# Patient Record
Sex: MALE | Race: BLACK OR AFRICAN AMERICAN | NOT HISPANIC OR LATINO | URBAN - METROPOLITAN AREA
[De-identification: names, ages, dates, MRNs, and addresses within clinical notes are randomized per-mention and may not be internally consistent; named-entity substitution may affect disease eponyms.]

---

## 2016-10-12 RX ORDER — HYDRALAZINE HCL 50 MG
1 TABLET ORAL
Qty: 0 | Refills: 0 | COMMUNITY
Start: 2016-10-12

## 2017-02-18 ENCOUNTER — INPATIENT (INPATIENT)
Facility: HOSPITAL | Age: 77
LOS: 2 days | Discharge: HOME | End: 2017-02-21
Attending: INTERNAL MEDICINE | Admitting: INTERNAL MEDICINE
Payer: MEDICARE

## 2017-02-18 VITALS
SYSTOLIC BLOOD PRESSURE: 228 MMHG | HEART RATE: 79 BPM | RESPIRATION RATE: 16 BRPM | DIASTOLIC BLOOD PRESSURE: 122 MMHG | OXYGEN SATURATION: 100 % | TEMPERATURE: 98 F

## 2017-02-18 DIAGNOSIS — I16.0 HYPERTENSIVE URGENCY: ICD-10-CM

## 2017-02-18 DIAGNOSIS — N18.6 END STAGE RENAL DISEASE: ICD-10-CM

## 2017-02-18 DIAGNOSIS — E11.42 TYPE 2 DIABETES MELLITUS WITH DIABETIC POLYNEUROPATHY: ICD-10-CM

## 2017-02-18 LAB
ALBUMIN SERPL ELPH-MCNC: 4 G/DL — SIGNIFICANT CHANGE UP (ref 3.3–5)
ALP SERPL-CCNC: 42 U/L — SIGNIFICANT CHANGE UP (ref 40–120)
ALT FLD-CCNC: 18 U/L — SIGNIFICANT CHANGE UP (ref 4–41)
AST SERPL-CCNC: 46 U/L — HIGH (ref 4–40)
BASE EXCESS BLDV CALC-SCNC: 5.8 MMOL/L — SIGNIFICANT CHANGE UP
BASOPHILS # BLD AUTO: 0.02 K/UL — SIGNIFICANT CHANGE UP (ref 0–0.2)
BASOPHILS NFR BLD AUTO: 0.2 % — SIGNIFICANT CHANGE UP (ref 0–2)
BILIRUB SERPL-MCNC: 1 MG/DL — SIGNIFICANT CHANGE UP (ref 0.2–1.2)
BLOOD GAS VENOUS - CREATININE: 6.36 MG/DL — HIGH (ref 0.5–1.3)
BUN SERPL-MCNC: 35 MG/DL — HIGH (ref 7–23)
CALCIUM SERPL-MCNC: 8.9 MG/DL — SIGNIFICANT CHANGE UP (ref 8.4–10.5)
CHLORIDE BLDV-SCNC: 101 MMOL/L — SIGNIFICANT CHANGE UP (ref 96–108)
CHLORIDE SERPL-SCNC: 94 MMOL/L — LOW (ref 98–107)
CK MB BLD-MCNC: 0.9 — SIGNIFICANT CHANGE UP (ref 0–2.5)
CK MB BLD-MCNC: 5.08 NG/ML — SIGNIFICANT CHANGE UP (ref 1–6.6)
CK SERPL-CCNC: 468 U/L — HIGH (ref 30–200)
CK SERPL-CCNC: 578 U/L — HIGH (ref 30–200)
CO2 SERPL-SCNC: 25 MMOL/L — SIGNIFICANT CHANGE UP (ref 22–31)
CREAT SERPL-MCNC: 5.93 MG/DL — HIGH (ref 0.5–1.3)
EOSINOPHIL # BLD AUTO: 0.32 K/UL — SIGNIFICANT CHANGE UP (ref 0–0.5)
EOSINOPHIL NFR BLD AUTO: 4 % — SIGNIFICANT CHANGE UP (ref 0–6)
GAS PNL BLDV: 134 MMOL/L — LOW (ref 136–146)
GLUCOSE BLDV-MCNC: 116 — HIGH (ref 70–99)
GLUCOSE SERPL-MCNC: 120 MG/DL — HIGH (ref 70–99)
HBA1C BLD-MCNC: 5.1 % — SIGNIFICANT CHANGE UP (ref 4–5.6)
HBV SURFACE AG SER-ACNC: NEGATIVE — SIGNIFICANT CHANGE UP
HCO3 BLDV-SCNC: 28 MMOL/L — HIGH (ref 20–27)
HCT VFR BLD CALC: 36.1 % — LOW (ref 39–50)
HCT VFR BLDV CALC: 36.3 % — LOW (ref 39–51)
HGB BLD-MCNC: 11.7 G/DL — LOW (ref 13–17)
HGB BLDV-MCNC: 11.8 G/DL — LOW (ref 13–17)
IMM GRANULOCYTES NFR BLD AUTO: 0.1 % — SIGNIFICANT CHANGE UP (ref 0–1.5)
LACTATE BLDV-MCNC: 1.4 MMOL/L — SIGNIFICANT CHANGE UP (ref 0.5–2)
LYMPHOCYTES # BLD AUTO: 1.81 K/UL — SIGNIFICANT CHANGE UP (ref 1–3.3)
LYMPHOCYTES # BLD AUTO: 22.3 % — SIGNIFICANT CHANGE UP (ref 13–44)
MCHC RBC-ENTMCNC: 32.4 % — SIGNIFICANT CHANGE UP (ref 32–36)
MCHC RBC-ENTMCNC: 32.5 PG — SIGNIFICANT CHANGE UP (ref 27–34)
MCV RBC AUTO: 100.3 FL — HIGH (ref 80–100)
MONOCYTES # BLD AUTO: 1.05 K/UL — HIGH (ref 0–0.9)
MONOCYTES NFR BLD AUTO: 13 % — SIGNIFICANT CHANGE UP (ref 2–14)
NEUTROPHILS # BLD AUTO: 4.89 K/UL — SIGNIFICANT CHANGE UP (ref 1.8–7.4)
NEUTROPHILS NFR BLD AUTO: 60.4 % — SIGNIFICANT CHANGE UP (ref 43–77)
PCO2 BLDV: 56 MMHG — HIGH (ref 41–51)
PH BLDV: 7.36 PH — SIGNIFICANT CHANGE UP (ref 7.32–7.43)
PLATELET # BLD AUTO: 192 K/UL — SIGNIFICANT CHANGE UP (ref 150–400)
PMV BLD: 10.6 FL — SIGNIFICANT CHANGE UP (ref 7–13)
PO2 BLDV: 27 MMHG — LOW (ref 35–40)
POTASSIUM BLDV-SCNC: 4.1 MMOL/L — SIGNIFICANT CHANGE UP (ref 3.4–4.5)
POTASSIUM SERPL-MCNC: SIGNIFICANT CHANGE UP MMOL/L (ref 3.5–5.3)
POTASSIUM SERPL-SCNC: SIGNIFICANT CHANGE UP MMOL/L (ref 3.5–5.3)
PROT SERPL-MCNC: 8.2 G/DL — SIGNIFICANT CHANGE UP (ref 6–8.3)
RBC # BLD: 3.6 M/UL — LOW (ref 4.2–5.8)
RBC # FLD: 14.9 % — HIGH (ref 10.3–14.5)
SAO2 % BLDV: 42.8 % — LOW (ref 60–85)
SODIUM SERPL-SCNC: 136 MMOL/L — SIGNIFICANT CHANGE UP (ref 135–145)
TROPONIN T SERPL-MCNC: 0.11 NG/ML — HIGH (ref 0–0.06)
TROPONIN T SERPL-MCNC: 0.14 NG/ML — HIGH (ref 0–0.06)
WBC # BLD: 8.1 K/UL — SIGNIFICANT CHANGE UP (ref 3.8–10.5)
WBC # FLD AUTO: 8.1 K/UL — SIGNIFICANT CHANGE UP (ref 3.8–10.5)

## 2017-02-18 PROCEDURE — 99291 CRITICAL CARE FIRST HOUR: CPT

## 2017-02-18 PROCEDURE — 99223 1ST HOSP IP/OBS HIGH 75: CPT

## 2017-02-18 PROCEDURE — 70450 CT HEAD/BRAIN W/O DYE: CPT | Mod: 26

## 2017-02-18 RX ORDER — LABETALOL HCL 100 MG
100 TABLET ORAL ONCE
Qty: 0 | Refills: 0 | Status: COMPLETED | OUTPATIENT
Start: 2017-02-18 | End: 2017-02-18

## 2017-02-18 RX ORDER — HYDRALAZINE HCL 50 MG
50 TABLET ORAL ONCE
Qty: 0 | Refills: 0 | Status: COMPLETED | OUTPATIENT
Start: 2017-02-18 | End: 2017-02-18

## 2017-02-18 RX ORDER — ACETAMINOPHEN 500 MG
650 TABLET ORAL ONCE
Qty: 0 | Refills: 0 | Status: COMPLETED | OUTPATIENT
Start: 2017-02-18 | End: 2017-02-18

## 2017-02-18 RX ORDER — ASPIRIN/CALCIUM CARB/MAGNESIUM 324 MG
81 TABLET ORAL DAILY
Qty: 0 | Refills: 0 | Status: DISCONTINUED | OUTPATIENT
Start: 2017-02-18 | End: 2017-02-21

## 2017-02-18 RX ORDER — GLUCAGON INJECTION, SOLUTION 0.5 MG/.1ML
1 INJECTION, SOLUTION SUBCUTANEOUS ONCE
Qty: 0 | Refills: 0 | Status: DISCONTINUED | OUTPATIENT
Start: 2017-02-18 | End: 2017-02-21

## 2017-02-18 RX ORDER — SUCRALFATE 1 G
1 TABLET ORAL
Qty: 0 | Refills: 0 | Status: DISCONTINUED | OUTPATIENT
Start: 2017-02-18 | End: 2017-02-21

## 2017-02-18 RX ORDER — DEXTROSE 50 % IN WATER 50 %
25 SYRINGE (ML) INTRAVENOUS ONCE
Qty: 0 | Refills: 0 | Status: DISCONTINUED | OUTPATIENT
Start: 2017-02-18 | End: 2017-02-21

## 2017-02-18 RX ORDER — HEPARIN SODIUM 5000 [USP'U]/ML
5000 INJECTION INTRAVENOUS; SUBCUTANEOUS EVERY 12 HOURS
Qty: 0 | Refills: 0 | Status: DISCONTINUED | OUTPATIENT
Start: 2017-02-18 | End: 2017-02-21

## 2017-02-18 RX ORDER — PANTOPRAZOLE SODIUM 20 MG/1
40 TABLET, DELAYED RELEASE ORAL
Qty: 0 | Refills: 0 | Status: DISCONTINUED | OUTPATIENT
Start: 2017-02-18 | End: 2017-02-21

## 2017-02-18 RX ORDER — LABETALOL HCL 100 MG
200 TABLET ORAL THREE TIMES A DAY
Qty: 0 | Refills: 0 | Status: DISCONTINUED | OUTPATIENT
Start: 2017-02-19 | End: 2017-02-21

## 2017-02-18 RX ORDER — GABAPENTIN 400 MG/1
50 CAPSULE ORAL ONCE
Qty: 0 | Refills: 0 | Status: COMPLETED | OUTPATIENT
Start: 2017-02-18 | End: 2017-02-18

## 2017-02-18 RX ORDER — HYDRALAZINE HCL 50 MG
100 TABLET ORAL
Qty: 0 | Refills: 0 | Status: DISCONTINUED | OUTPATIENT
Start: 2017-02-18 | End: 2017-02-18

## 2017-02-18 RX ORDER — SUCRALFATE 1 G
1 TABLET ORAL
Qty: 0 | Refills: 0 | COMMUNITY

## 2017-02-18 RX ORDER — HYDRALAZINE HCL 50 MG
100 TABLET ORAL THREE TIMES A DAY
Qty: 0 | Refills: 0 | Status: DISCONTINUED | OUTPATIENT
Start: 2017-02-18 | End: 2017-02-21

## 2017-02-18 RX ORDER — CAPSAICIN 0.025 %
1 CREAM (GRAM) TOPICAL
Qty: 0 | Refills: 0 | Status: DISCONTINUED | OUTPATIENT
Start: 2017-02-18 | End: 2017-02-18

## 2017-02-18 RX ORDER — INSULIN LISPRO 100/ML
VIAL (ML) SUBCUTANEOUS AT BEDTIME
Qty: 0 | Refills: 0 | Status: DISCONTINUED | OUTPATIENT
Start: 2017-02-18 | End: 2017-02-21

## 2017-02-18 RX ORDER — DEXTROSE 50 % IN WATER 50 %
12.5 SYRINGE (ML) INTRAVENOUS ONCE
Qty: 0 | Refills: 0 | Status: DISCONTINUED | OUTPATIENT
Start: 2017-02-18 | End: 2017-02-21

## 2017-02-18 RX ORDER — SODIUM CHLORIDE 9 MG/ML
1000 INJECTION, SOLUTION INTRAVENOUS
Qty: 0 | Refills: 0 | Status: DISCONTINUED | OUTPATIENT
Start: 2017-02-18 | End: 2017-02-21

## 2017-02-18 RX ORDER — DEXTROSE 50 % IN WATER 50 %
1 SYRINGE (ML) INTRAVENOUS ONCE
Qty: 0 | Refills: 0 | Status: DISCONTINUED | OUTPATIENT
Start: 2017-02-18 | End: 2017-02-21

## 2017-02-18 RX ORDER — LABETALOL HCL 100 MG
10 TABLET ORAL ONCE
Qty: 0 | Refills: 0 | Status: COMPLETED | OUTPATIENT
Start: 2017-02-18 | End: 2017-02-18

## 2017-02-18 RX ORDER — DOCUSATE SODIUM 100 MG
100 CAPSULE ORAL THREE TIMES A DAY
Qty: 0 | Refills: 0 | Status: DISCONTINUED | OUTPATIENT
Start: 2017-02-18 | End: 2017-02-21

## 2017-02-18 RX ORDER — INSULIN LISPRO 100/ML
VIAL (ML) SUBCUTANEOUS
Qty: 0 | Refills: 0 | Status: DISCONTINUED | OUTPATIENT
Start: 2017-02-18 | End: 2017-02-21

## 2017-02-18 RX ORDER — ACETAMINOPHEN 500 MG
650 TABLET ORAL EVERY 6 HOURS
Qty: 0 | Refills: 0 | Status: DISCONTINUED | OUTPATIENT
Start: 2017-02-18 | End: 2017-02-21

## 2017-02-18 RX ORDER — LABETALOL HCL 100 MG
100 TABLET ORAL ONCE
Qty: 0 | Refills: 0 | Status: DISCONTINUED | OUTPATIENT
Start: 2017-02-18 | End: 2017-02-21

## 2017-02-18 RX ORDER — SIMVASTATIN 20 MG/1
40 TABLET, FILM COATED ORAL AT BEDTIME
Qty: 0 | Refills: 0 | Status: DISCONTINUED | OUTPATIENT
Start: 2017-02-18 | End: 2017-02-21

## 2017-02-18 RX ADMIN — Medication 50 MILLIGRAM(S): at 09:44

## 2017-02-18 RX ADMIN — Medication 100 MILLIGRAM(S): at 18:44

## 2017-02-18 RX ADMIN — Medication 100 MILLIGRAM(S): at 21:15

## 2017-02-18 RX ADMIN — Medication 100 MILLIGRAM(S): at 13:24

## 2017-02-18 RX ADMIN — GABAPENTIN 50 MILLIGRAM(S): 400 CAPSULE ORAL at 13:24

## 2017-02-18 RX ADMIN — Medication 10 MILLIGRAM(S): at 13:19

## 2017-02-18 RX ADMIN — Medication 650 MILLIGRAM(S): at 10:50

## 2017-02-18 RX ADMIN — Medication 650 MILLIGRAM(S): at 01:14

## 2017-02-18 RX ADMIN — SIMVASTATIN 40 MILLIGRAM(S): 20 TABLET, FILM COATED ORAL at 21:15

## 2017-02-18 NOTE — ED BEHAVIORAL HEALTH NOTE - BEHAVIORAL HEALTH NOTE
Called by medical team regarding this patient; clinician stated that an emergent consult is not needed and will update [facility] psychiatry tomorrow.  Clinician aware that Emergency Psychiatry remains available for urgent issues.

## 2017-02-18 NOTE — ED PROVIDER NOTE - OBJECTIVE STATEMENT
78yo M w/ DM, HTN, ESRD on HD (T/Th/Sat),  p/w 3d of sensation of "pulling at his feet" that he feels is supernatural in origin.  Pt's wife at bedside states that she has not observed anything external causing these symptoms, and thinks the symptoms are due to DM neuropathy.  Pt did not complete HD session today because of these sensations.  Denies other focal symptoms.

## 2017-02-18 NOTE — H&P ADULT. - ASSESSMENT
77M ESRD on HD t/t/s, HTN, DM here w/ hypertensive urgency and abnl sensation in feet- possible neuropathic component; concerned about hypertensive encephalopathy- given uncontrolled bp's, some gait instability;   ?confusion subtle- given the description of a paranormal being on his feet

## 2017-02-18 NOTE — H&P ADULT. - PROBLEM SELECTOR PLAN 1
would lower bp by 20% today  goal 180s for 1st 24h  check ct head r/o cva  f/u enzymes  labetalol 100 po X1- pt lost iv access- iv push labetalol not given yet  ekg changed-cards [facility] called f/u recs

## 2017-02-18 NOTE — ED PROVIDER NOTE - PROGRESS NOTE DETAILS
D/w renal Dr. Peralta, who had seen pt for pt's nephrologist Dr. Flores during previous admission.  Given pt's elevated BP, he agrees w/ admission for completion of HD.  Asked to call Dr. Miranda for admission, d/w Dr. Miranda who accepted pt for admission.

## 2017-02-18 NOTE — ED PROVIDER NOTE - PMH
Bilateral cataracts  surgery done in 2006 (approx)  DM (diabetes mellitus)    ESRD (end stage renal disease)    Hypercholesteremia    Hypertension

## 2017-02-18 NOTE — ED PROVIDER NOTE - MUSCULOSKELETAL, MLM
Spine appears normal, range of motion is not limited, no muscle or joint tenderness, +RUE AVF w/ thrill

## 2017-02-18 NOTE — PROVIDER CONTACT NOTE (OTHER) - ASSESSMENT
A & O X 4 . VS taken w/ high BP. Auscultate lungs w/ fine crackles . Denies chest pain, no SOB & not in distress.

## 2017-02-18 NOTE — H&P ADULT. - HISTORY OF PRESENT ILLNESS
77M with HTN, DM, ESRD on HD (Tu, Thu, Sa), bib from dialysis this am because he was c/o abnormal sensation in his feet- asked the dialysis nurse to stop the session- he states that since Thursday Feb 16th (2 d PTA) he's been feeling that "someone is pulling his toes" and that some "paranormal being is sitting on his feet".  He doesn't see people that are not there nor does he hear voices.  Wife feels that that is the way he is describing the sensation and that he is not confused.  Of note, though, he was asking his wife for the food at home, when it was right in front of him.  Per wife, BP has been high the last couple of days.    Pt has also been "stumbling more while walking" per wife.  He denies lightheadedness or dizziness; does report a dull headache for a couple of days as well; no change in vision.  no f/c/cp/sob/abd pain/n/v/urinary issues.  "makes very little urine".  denies lower extremity cramping/burning pain     ED bp 224/119 100%  given hydralazine 50po X1  renal saw him in ER and arranged for more dialysis to be done today 77M with HTN, DM, ESRD on HD (Tu, Thu, Sa), bib from dialysis this am because he was c/o abnormal sensation in his feet- asked the dialysis nurse to stop the session- he states that since Thursday Feb 16th (2 d PTA) he's been feeling that "someone is pulling his toes" and that some "paranormal being is sitting on his feet".  He doesn't see people that are not there nor does he hear voices.  Wife feels that that is the way he is describing the sensation and that he is not confused.  Of note, though, he was asking his wife for the food at home, when it was right in front of him.  Per wife, BP has been high the last couple of days.    Pt has also been "stumbling more while walking" per wife.  He denies lightheadedness or dizziness; does report a dull headache for a couple of days as well; no change in vision.  no f/c/cp/sob/abd pain/n/v/urinary issues.  "makes very little urine".  denies lower extremity cramping/burning pain; wife reports no sleep X2days d/t the sensation in his feet    ED bp 224/119 100%  given hydralazine 50po X1  renal saw him in ER and arranged for more dialysis to be done today

## 2017-02-18 NOTE — H&P ADULT. - FAMILY HISTORY
<<-----Click on this checkbox to enter Family History Diabetes mellitus, type 2, Mother     Sibling  Still living? Unknown  Family history of heart disease, Age at diagnosis: Age Unknown

## 2017-02-18 NOTE — ED PROVIDER NOTE - PSH
AV fistula  R forearm  Bilateral cataracts  b/l surgery done in 2006 (approx)  Hydrocele, unspecified  Pt states that he had hydrocele surgery in 1989.

## 2017-02-18 NOTE — ED ADULT NURSE NOTE - OBJECTIVE STATEMENT
Received pt. in rm 11, A&Ox3, c/o auditory and visual hallucinations x 2 days. Pt. states that he feels something "supernatural thing that is pulling at his feet". Pt. only received 2 hours of dialysis tx this morning before coming to ER. Right AV fistula; goes to dialysis t/th/sat. Denies any pain and has full sensation/strength of extremities. #20G IV placed in left lower arm; labs sent; unable to make urine for sample. MD at bedside for eval. VS done as charted in triage; BP med given as ordered. Denies cp, sob or dizziness. Will continue to monitor.     pt with a c/o Auditory and Visual Hallucinations , pt states " someone put something supernatural in my home , the thing was pulling at my feet all night, pt states I went to dialysis this am , and was only able to have 2 hours of treatment because the creature was pulling at my feet" . F/S at triage 110 Received pt. in rm 11, A&Ox2, c/o auditory and visual hallucinations x 2 days. Pt. states that he feels something "supernatural thing that is pulling at his feet". Pt. only received 2 hours of dialysis tx this morning before coming to ER. Right AV fistula; goes to dialysis t/th/sat. Denies any pain and has full sensation/strength of extremities. #20G IV placed in left lower arm; labs sent; unable to make urine for sample. MD at bedside for eval. VS done as charted in triage; BP med given as ordered. Denies cp, sob or dizziness. Will continue to monitor.     pt with a c/o Auditory and Visual Hallucinations , pt states " someone put something supernatural in my home , the thing was pulling at my feet all night, pt states I went to dialysis this am , and was only able to have 2 hours of treatment because the creature was pulling at my feet" . F/S at triage 110

## 2017-02-18 NOTE — ED PROVIDER NOTE - ATTENDING CONTRIBUTION TO CARE
Attending note:   After face to face evaluation of this patient, I concur with above noted hx, pe, and care plan for this patient. +DM, dialysis with sense of supranatural forces affecting his toes.    Did not complete dialysis this am.    Evaluation in progress

## 2017-02-18 NOTE — ED ADULT TRIAGE NOTE - CHIEF COMPLAINT QUOTE
pt with a c/o Auditory and Visual Hallucinations , pt states " someone put something supernatural in my home , the thing was pulling at my feet all night, pt states I went to dialysis this am , and was only able to have 2 hours of treatment because the creature was pulling at my feet" . F/S at triage 110  pmhx ERSD HTN DM

## 2017-02-19 LAB
ALBUMIN SERPL ELPH-MCNC: 3.9 G/DL — SIGNIFICANT CHANGE UP (ref 3.3–5)
ALP SERPL-CCNC: 48 U/L — SIGNIFICANT CHANGE UP (ref 40–120)
ALT FLD-CCNC: 12 U/L — SIGNIFICANT CHANGE UP (ref 4–41)
AST SERPL-CCNC: 29 U/L — SIGNIFICANT CHANGE UP (ref 4–40)
BILIRUB SERPL-MCNC: 0.7 MG/DL — SIGNIFICANT CHANGE UP (ref 0.2–1.2)
BUN SERPL-MCNC: 25 MG/DL — HIGH (ref 7–23)
CALCIUM SERPL-MCNC: 8.6 MG/DL — SIGNIFICANT CHANGE UP (ref 8.4–10.5)
CHLORIDE SERPL-SCNC: 95 MMOL/L — LOW (ref 98–107)
CHOLEST SERPL-MCNC: 198 MG/DL — SIGNIFICANT CHANGE UP (ref 120–199)
CK MB BLD-MCNC: 4.77 NG/ML — SIGNIFICANT CHANGE UP (ref 1–6.6)
CK MB BLD-MCNC: 5.18 NG/ML — SIGNIFICANT CHANGE UP (ref 1–6.6)
CK SERPL-CCNC: 501 U/L — HIGH (ref 30–200)
CK SERPL-CCNC: 539 U/L — HIGH (ref 30–200)
CO2 SERPL-SCNC: 28 MMOL/L — SIGNIFICANT CHANGE UP (ref 22–31)
CREAT SERPL-MCNC: 5.28 MG/DL — HIGH (ref 0.5–1.3)
GLUCOSE SERPL-MCNC: 105 MG/DL — HIGH (ref 70–99)
HDLC SERPL-MCNC: 65 MG/DL — HIGH (ref 35–55)
LIPID PNL WITH DIRECT LDL SERPL: 115 MG/DL — SIGNIFICANT CHANGE UP
POTASSIUM SERPL-MCNC: 3.9 MMOL/L — SIGNIFICANT CHANGE UP (ref 3.5–5.3)
POTASSIUM SERPL-SCNC: 3.9 MMOL/L — SIGNIFICANT CHANGE UP (ref 3.5–5.3)
PROT SERPL-MCNC: 7.1 G/DL — SIGNIFICANT CHANGE UP (ref 6–8.3)
SODIUM SERPL-SCNC: 137 MMOL/L — SIGNIFICANT CHANGE UP (ref 135–145)
TRIGL SERPL-MCNC: 42 MG/DL — SIGNIFICANT CHANGE UP (ref 10–149)
TROPONIN T SERPL-MCNC: 0.16 NG/ML — HIGH (ref 0–0.06)
TROPONIN T SERPL-MCNC: 0.19 NG/ML — HIGH (ref 0–0.06)
TSH SERPL-MCNC: 1.61 UIU/ML — SIGNIFICANT CHANGE UP (ref 0.27–4.2)

## 2017-02-19 RX ADMIN — PANTOPRAZOLE SODIUM 40 MILLIGRAM(S): 20 TABLET, DELAYED RELEASE ORAL at 05:36

## 2017-02-19 RX ADMIN — Medication 1 GRAM(S): at 05:33

## 2017-02-19 RX ADMIN — Medication 100 MILLIGRAM(S): at 15:20

## 2017-02-19 RX ADMIN — Medication 81 MILLIGRAM(S): at 12:27

## 2017-02-19 RX ADMIN — Medication 1 TABLET(S): at 15:19

## 2017-02-19 RX ADMIN — Medication 200 MILLIGRAM(S): at 05:33

## 2017-02-19 RX ADMIN — PANTOPRAZOLE SODIUM 40 MILLIGRAM(S): 20 TABLET, DELAYED RELEASE ORAL at 18:52

## 2017-02-19 RX ADMIN — Medication 1 GRAM(S): at 18:53

## 2017-02-19 RX ADMIN — HEPARIN SODIUM 5000 UNIT(S): 5000 INJECTION INTRAVENOUS; SUBCUTANEOUS at 18:53

## 2017-02-19 RX ADMIN — HEPARIN SODIUM 5000 UNIT(S): 5000 INJECTION INTRAVENOUS; SUBCUTANEOUS at 05:33

## 2017-02-19 RX ADMIN — Medication 100 MILLIGRAM(S): at 05:33

## 2017-02-19 RX ADMIN — Medication 1 GRAM(S): at 12:27

## 2017-02-19 RX ADMIN — Medication 200 MILLIGRAM(S): at 15:24

## 2017-02-19 RX ADMIN — SIMVASTATIN 40 MILLIGRAM(S): 20 TABLET, FILM COATED ORAL at 21:37

## 2017-02-19 RX ADMIN — Medication 200 MILLIGRAM(S): at 21:37

## 2017-02-19 NOTE — OCCUPATIONAL THERAPY INITIAL EVALUATION ADULT - PERTINENT HX OF CURRENT PROBLEM, REHAB EVAL
Pt is a 77M w/ a hx of HTN, DM, & ESRD on HD (Tu, Thu, Sa), who presents from dialysis to [facility] on 2/18/17 because he was c/o abnormal sensation in his feet- asked the dialysis nurse to stop the session- he states that since Thursday Feb 16th (2 d PTA) he's been feeling that "someone is pulling his toes" & that some "paranormal being is sitting on his feet.” (See continued below).

## 2017-02-19 NOTE — OCCUPATIONAL THERAPY INITIAL EVALUATION ADULT - ADDITIONAL COMMENTS
(See continued from above). CT Head No Contrast on 2/18/17 displays age-indeterminate right thalamic lacune not seen previously. No mass effect or intracranial hemorrhage.

## 2017-02-19 NOTE — OCCUPATIONAL THERAPY INITIAL EVALUATION ADULT - ANTICIPATED DISCHARGE DISPOSITION, OT EVAL
Patient appears to be at/close to baseline functional status, recommend home w/ no acute OT services required at this time. Pt. will be discharged from inpatient OT services. If any change in pt.'s status noted, please request new consult.

## 2017-02-19 NOTE — OCCUPATIONAL THERAPY INITIAL EVALUATION ADULT - LIVES WITH, PROFILE
Pt. reports he lives with his wife in a house with 3 steps to enter. Once inside, pt. reports he has no steps to negotiate & bedroom and bathroom are located on the main level. Per pt., he has a bathtub in his bathroom.

## 2017-02-19 NOTE — OCCUPATIONAL THERAPY INITIAL EVALUATION ADULT - MD ORDER
Occupational Therapy to evaluate and treat. Occupational Therapy to evaluate and treat. Ambulate w/ assistance. Per RN, pt. is okay to participate in OT evaluation & perform OOB activity.

## 2017-02-20 LAB
BUN SERPL-MCNC: 56 MG/DL — HIGH (ref 7–23)
CALCIUM SERPL-MCNC: 9.1 MG/DL — SIGNIFICANT CHANGE UP (ref 8.4–10.5)
CHLORIDE SERPL-SCNC: 91 MMOL/L — LOW (ref 98–107)
CO2 SERPL-SCNC: 24 MMOL/L — SIGNIFICANT CHANGE UP (ref 22–31)
CREAT SERPL-MCNC: 8.58 MG/DL — HIGH (ref 0.5–1.3)
GLUCOSE SERPL-MCNC: 115 MG/DL — HIGH (ref 70–99)
HCT VFR BLD CALC: 36 % — LOW (ref 39–50)
HGB BLD-MCNC: 11.6 G/DL — LOW (ref 13–17)
MAGNESIUM SERPL-MCNC: 2.2 MG/DL — SIGNIFICANT CHANGE UP (ref 1.6–2.6)
MCHC RBC-ENTMCNC: 32.2 % — SIGNIFICANT CHANGE UP (ref 32–36)
MCHC RBC-ENTMCNC: 32.4 PG — SIGNIFICANT CHANGE UP (ref 27–34)
MCV RBC AUTO: 100.6 FL — HIGH (ref 80–100)
PLATELET # BLD AUTO: 200 K/UL — SIGNIFICANT CHANGE UP (ref 150–400)
PMV BLD: 10.6 FL — SIGNIFICANT CHANGE UP (ref 7–13)
POTASSIUM SERPL-MCNC: 4.3 MMOL/L — SIGNIFICANT CHANGE UP (ref 3.5–5.3)
POTASSIUM SERPL-SCNC: 4.3 MMOL/L — SIGNIFICANT CHANGE UP (ref 3.5–5.3)
RBC # BLD: 3.58 M/UL — LOW (ref 4.2–5.8)
RBC # FLD: 14.9 % — HIGH (ref 10.3–14.5)
SODIUM SERPL-SCNC: 137 MMOL/L — SIGNIFICANT CHANGE UP (ref 135–145)
WBC # BLD: 7.29 K/UL — SIGNIFICANT CHANGE UP (ref 3.8–10.5)
WBC # FLD AUTO: 7.29 K/UL — SIGNIFICANT CHANGE UP (ref 3.8–10.5)

## 2017-02-20 PROCEDURE — 93880 EXTRACRANIAL BILAT STUDY: CPT | Mod: 26

## 2017-02-20 PROCEDURE — 93306 TTE W/DOPPLER COMPLETE: CPT | Mod: 26

## 2017-02-20 RX ORDER — GABAPENTIN 400 MG/1
300 CAPSULE ORAL ONCE
Qty: 0 | Refills: 0 | Status: DISCONTINUED | OUTPATIENT
Start: 2017-02-20 | End: 2017-02-20

## 2017-02-20 RX ORDER — GABAPENTIN 400 MG/1
150 CAPSULE ORAL ONCE
Qty: 0 | Refills: 0 | Status: COMPLETED | OUTPATIENT
Start: 2017-02-20 | End: 2017-02-20

## 2017-02-20 RX ORDER — GABAPENTIN 400 MG/1
800 CAPSULE ORAL
Qty: 0 | Refills: 0 | Status: DISCONTINUED | OUTPATIENT
Start: 2017-02-20 | End: 2017-02-20

## 2017-02-20 RX ORDER — GABAPENTIN 400 MG/1
150 CAPSULE ORAL ONCE
Qty: 0 | Refills: 0 | Status: DISCONTINUED | OUTPATIENT
Start: 2017-02-20 | End: 2017-02-20

## 2017-02-20 RX ADMIN — Medication 100 MILLIGRAM(S): at 20:57

## 2017-02-20 RX ADMIN — Medication 200 MILLIGRAM(S): at 14:10

## 2017-02-20 RX ADMIN — PANTOPRAZOLE SODIUM 40 MILLIGRAM(S): 20 TABLET, DELAYED RELEASE ORAL at 16:39

## 2017-02-20 RX ADMIN — Medication 200 MILLIGRAM(S): at 05:11

## 2017-02-20 RX ADMIN — SIMVASTATIN 40 MILLIGRAM(S): 20 TABLET, FILM COATED ORAL at 20:58

## 2017-02-20 RX ADMIN — Medication 200 MILLIGRAM(S): at 20:57

## 2017-02-20 RX ADMIN — Medication 100 MILLIGRAM(S): at 05:11

## 2017-02-20 RX ADMIN — Medication 1: at 12:30

## 2017-02-20 RX ADMIN — Medication 1 GRAM(S): at 16:39

## 2017-02-20 RX ADMIN — Medication 1 GRAM(S): at 05:11

## 2017-02-20 RX ADMIN — Medication 100 MILLIGRAM(S): at 14:10

## 2017-02-20 RX ADMIN — Medication 1 TABLET(S): at 11:10

## 2017-02-20 RX ADMIN — PANTOPRAZOLE SODIUM 40 MILLIGRAM(S): 20 TABLET, DELAYED RELEASE ORAL at 05:11

## 2017-02-20 RX ADMIN — Medication 1 GRAM(S): at 11:10

## 2017-02-20 RX ADMIN — Medication 81 MILLIGRAM(S): at 11:10

## 2017-02-20 RX ADMIN — HEPARIN SODIUM 5000 UNIT(S): 5000 INJECTION INTRAVENOUS; SUBCUTANEOUS at 16:41

## 2017-02-20 RX ADMIN — GABAPENTIN 150 MILLIGRAM(S): 400 CAPSULE ORAL at 16:39

## 2017-02-20 RX ADMIN — HEPARIN SODIUM 5000 UNIT(S): 5000 INJECTION INTRAVENOUS; SUBCUTANEOUS at 05:11

## 2017-02-21 VITALS
HEART RATE: 73 BPM | RESPIRATION RATE: 18 BRPM | DIASTOLIC BLOOD PRESSURE: 75 MMHG | SYSTOLIC BLOOD PRESSURE: 124 MMHG | TEMPERATURE: 99 F | OXYGEN SATURATION: 99 %

## 2017-02-21 LAB
BUN SERPL-MCNC: 72 MG/DL — HIGH (ref 7–23)
CALCIUM SERPL-MCNC: 8.8 MG/DL — SIGNIFICANT CHANGE UP (ref 8.4–10.5)
CHLORIDE SERPL-SCNC: 93 MMOL/L — LOW (ref 98–107)
CO2 SERPL-SCNC: 18 MMOL/L — LOW (ref 22–31)
CREAT SERPL-MCNC: 10.74 MG/DL — HIGH (ref 0.5–1.3)
GLUCOSE SERPL-MCNC: 84 MG/DL — SIGNIFICANT CHANGE UP (ref 70–99)
HCT VFR BLD CALC: 32.9 % — LOW (ref 39–50)
HGB BLD-MCNC: 10.8 G/DL — LOW (ref 13–17)
MAGNESIUM SERPL-MCNC: 2.3 MG/DL — SIGNIFICANT CHANGE UP (ref 1.6–2.6)
MCHC RBC-ENTMCNC: 32.2 PG — SIGNIFICANT CHANGE UP (ref 27–34)
MCHC RBC-ENTMCNC: 32.8 % — SIGNIFICANT CHANGE UP (ref 32–36)
MCV RBC AUTO: 98.2 FL — SIGNIFICANT CHANGE UP (ref 80–100)
PLATELET # BLD AUTO: 194 K/UL — SIGNIFICANT CHANGE UP (ref 150–400)
PMV BLD: 10.2 FL — SIGNIFICANT CHANGE UP (ref 7–13)
POTASSIUM SERPL-MCNC: 3.9 MMOL/L — SIGNIFICANT CHANGE UP (ref 3.5–5.3)
POTASSIUM SERPL-SCNC: 3.9 MMOL/L — SIGNIFICANT CHANGE UP (ref 3.5–5.3)
RBC # BLD: 3.35 M/UL — LOW (ref 4.2–5.8)
RBC # FLD: 14.8 % — HIGH (ref 10.3–14.5)
SODIUM SERPL-SCNC: 138 MMOL/L — SIGNIFICANT CHANGE UP (ref 135–145)
WBC # BLD: 5.98 K/UL — SIGNIFICANT CHANGE UP (ref 3.8–10.5)
WBC # FLD AUTO: 5.98 K/UL — SIGNIFICANT CHANGE UP (ref 3.8–10.5)

## 2017-02-21 RX ORDER — GABAPENTIN 400 MG/1
100 CAPSULE ORAL DAILY
Qty: 0 | Refills: 0 | Status: DISCONTINUED | OUTPATIENT
Start: 2017-02-21 | End: 2017-02-21

## 2017-02-21 RX ORDER — ACETAMINOPHEN 500 MG
2 TABLET ORAL
Qty: 0 | Refills: 0 | COMMUNITY
Start: 2017-02-21

## 2017-02-21 RX ORDER — GABAPENTIN 400 MG/1
1 CAPSULE ORAL
Qty: 30 | Refills: 0 | OUTPATIENT
Start: 2017-02-21 | End: 2017-03-23

## 2017-02-21 RX ORDER — LABETALOL HCL 100 MG
1 TABLET ORAL
Qty: 90 | Refills: 0 | OUTPATIENT
Start: 2017-02-21 | End: 2017-03-23

## 2017-02-21 RX ORDER — DOCUSATE SODIUM 100 MG
1 CAPSULE ORAL
Qty: 0 | Refills: 0 | COMMUNITY
Start: 2017-02-21

## 2017-02-21 RX ORDER — HYDRALAZINE HCL 50 MG
1 TABLET ORAL
Qty: 90 | Refills: 0 | OUTPATIENT
Start: 2017-02-21 | End: 2017-03-23

## 2017-02-21 RX ADMIN — PANTOPRAZOLE SODIUM 40 MILLIGRAM(S): 20 TABLET, DELAYED RELEASE ORAL at 05:51

## 2017-02-21 RX ADMIN — Medication 100 MILLIGRAM(S): at 14:11

## 2017-02-21 RX ADMIN — Medication 200 MILLIGRAM(S): at 14:11

## 2017-02-21 RX ADMIN — Medication 81 MILLIGRAM(S): at 12:23

## 2017-02-21 RX ADMIN — HEPARIN SODIUM 5000 UNIT(S): 5000 INJECTION INTRAVENOUS; SUBCUTANEOUS at 05:50

## 2017-02-21 RX ADMIN — Medication 1 GRAM(S): at 12:23

## 2017-02-21 RX ADMIN — GABAPENTIN 100 MILLIGRAM(S): 400 CAPSULE ORAL at 12:22

## 2017-02-21 RX ADMIN — Medication 100 MILLIGRAM(S): at 05:50

## 2017-02-21 RX ADMIN — Medication 1 GRAM(S): at 05:51

## 2017-02-21 RX ADMIN — Medication 1 TABLET(S): at 12:23

## 2017-02-21 NOTE — DISCHARGE NOTE ADULT - HOSPITAL COURSE
78 y/o male with a PMHx of ESRD on HD T/R/S, HTN, HLD and DM presents to ED complaining of "someone pulling on my toes." Upon arrival to ED, EKG revealed NSR at 81 bpm, Q waves V1-2. Pt ruled out for ACS with three negative cardiac enzymes (slightly elevated in the setting of ESRD). CT head showed an age-indeterminate right thalamic lacune not seen previously. Pt was found to be hypertensive in the ED to 228/122. Pt was admitted to telemetry. Pt was seen by MICU, who recommended BP control. Pt was given IV Labetalol in the ED and his antihypertensive regimen was adjusted for better BP control. Pt was seen by neurology for concern for stroke and they recommended continuing ASA and statin, in addition to obtaining MRI/MRA. However, upon review of patient's symptoms, Dr. Interiano from neurology deemed pt symptoms to be due to neuropathy and the lacune seen on CT head was chronic. Pt was also seen by cardiology, who recommended echocardiogram. Echocardiogram revealed, "EF 71%. Normal left ventricular systolic function. No segmental wall motion abnormalities. Normal right ventricular size and function." Carotid dopplers revealed, "Mild heterogenous plaque noted within the proximal right and left internal carotid arteries, consistent with 16-49% stenoses in both proximal vessels. No hemodynamically significant stenoses noted." Neuro said MRI/MRA can be done outpatient. BP was well controlled with current antihypertensive regimen. Pt had no events on telemetry. Renal followed during admission and underwent HD as ordered. SW to re-instate for discharge. Discussed with Dr. Miranda on 2/21. Pt now stable for discharge home.

## 2017-02-21 NOTE — DISCHARGE NOTE ADULT - PLAN OF CARE
Maintain adequate control of your blood pressure. Goal BP < 130/80. Continue low sodium diet. Follow up with PCP and/or cardiologist for ongoing medical management of your hypertension. Continue medications as prescribed. Low salt diet. Prevent symptoms of neuropathy. Continue to take Neurontin. Follow up with PCP within one week of discharge. Call for appointment. Return to ED for any concerning symptoms. Continue medications as prescribed. Low salt, low fat, low cholesterol diet. Continue dialysis as instructed. Avoid NSAIDs and nephrotoxic agents (that may negatively affect your kidneys). Follow up with your nephrologist within 1-2 weeks of discharge. Continue to monitor your renal function. Avoid NSAIDs and nephrotoxic agents. Low K diet. Maintain adequate control of your cholesterol levels. Goal LDL < 70. Follow up with PCP for ongoing medical management. Continue medications as prescribed. Low cholesterol diet.

## 2017-02-21 NOTE — DISCHARGE NOTE ADULT - NS AS DC STROKE ED MATERIALS
Need for Followup After Discharge/Stroke Warning Signs and Symptoms/Risk Factors for Stroke/Stroke Education Booklet/Prescribed Medications/Call 911 for Stroke

## 2017-02-21 NOTE — DISCHARGE NOTE ADULT - MEDICATION SUMMARY - MEDICATIONS TO CHANGE
I will SWITCH the dose or number of times a day I take the medications listed below when I get home from the hospital:    labetalol 300 mg oral tablet  -- 1 tab(s) by mouth 3 times a day    hydrALAZINE 100 mg oral tablet  -- 1 tab(s) by mouth 2 times a day

## 2017-02-21 NOTE — DISCHARGE NOTE ADULT - MEDICATION SUMMARY - MEDICATIONS TO TAKE
I will START or STAY ON the medications listed below when I get home from the hospital:    aspirin 81 mg oral delayed release tablet  -- 1 tab(s) by mouth once a day  -- Indication: For Prophylaxis    acetaminophen 325 mg oral tablet  -- 2 tab(s) by mouth every 6 hours, As needed, For Temp greater than 38 C (100.4 F) and/or for mild to moderate pain  -- Indication: For Pain    gabapentin 100 mg oral capsule  -- 1 cap(s) by mouth once a day  -- Indication: For Neuropathy    simvastatin 40 mg oral tablet  -- 1 tab(s) by mouth once a day (at bedtime)  -- Indication: For HLD (hyperlipidemia)    labetalol 200 mg oral tablet  -- 1 tab(s) by mouth 3 times a day  -- Indication: For HTN (hypertension)    docusate sodium 100 mg oral capsule  -- 1 cap(s) by mouth 3 times a day  -- Indication: For Constipation    Carafate 1 g oral tablet  -- 1 tab(s) by mouth 3 times a day (before meals)  -- Indication: For GERD    pantoprazole 40 mg oral delayed release tablet  -- 1 tab(s) by mouth 2 times a day (before meals)  -- Indication: For GERD    hydrALAZINE 100 mg oral tablet  -- 1 tab(s) by mouth 3 times a day  -- Indication: For HTN (hypertension)    Nephro-Henok oral tablet  -- 1 tab(s) by mouth once a day  -- Indication: For ESRD (end stage renal disease)

## 2017-02-21 NOTE — DISCHARGE NOTE ADULT - ADDITIONAL INSTRUCTIONS
Follow up with PCP and/or cardiologist for ongoing medical management of your hypertension. Continue medications as prescribed. Low salt diet.

## 2017-02-21 NOTE — DISCHARGE NOTE ADULT - COMMUNITY RESOURCES
You are to resume outpatient dialysis at [facility] t: [phone] on Thursday 2/23.

## 2017-02-21 NOTE — DISCHARGE NOTE ADULT - NS AS ACTIVITY OBS
Do not drive or operate machinery/Walking-Indoors allowed/Walking-Outdoors allowed/Bathing allowed/No Heavy lifting/straining/Showering allowed

## 2017-02-21 NOTE — DISCHARGE NOTE ADULT - CARE PROVIDER_API CALL
Dimitrios Interiano), Neurology  [address] [place] [address]  Phone: [phone]  Fax: [phone] Dimitrios Interiano), Neurology  [address] [place] [address]  Phone: [phone]  Fax: [phone]

## 2017-02-21 NOTE — DISCHARGE NOTE ADULT - PATIENT PORTAL LINK FT
“You can access the Follow[employer] Patient Portal, offered by [facility], by registering with the following website: [email or web address]”

## 2017-02-21 NOTE — DISCHARGE NOTE ADULT - CARE PLAN
Principal Discharge DX:	Hypertensive urgency  Goal:	Maintain adequate control of your blood pressure. Goal BP < 130/80. Continue low sodium diet.  Instructions for follow-up, activity and diet:	Follow up with PCP and/or cardiologist for ongoing medical management of your hypertension. Continue medications as prescribed. Low salt diet.  Secondary Diagnosis:	Neuropathy  Goal:	Prevent symptoms of neuropathy. Continue to take Neurontin.  Instructions for follow-up, activity and diet:	Follow up with PCP within one week of discharge. Call for appointment. Return to ED for any concerning symptoms. Continue medications as prescribed. Low salt, low fat, low cholesterol diet.  Secondary Diagnosis:	ESRD (end stage renal disease)  Goal:	Continue dialysis as instructed. Avoid NSAIDs and nephrotoxic agents (that may negatively affect your kidneys).  Instructions for follow-up, activity and diet:	Follow up with your nephrologist within 1-2 weeks of discharge. Continue to monitor your renal function. Avoid NSAIDs and nephrotoxic agents. Low K diet.  Secondary Diagnosis:	HLD (hyperlipidemia)  Goal:	Maintain adequate control of your cholesterol levels. Goal LDL < 70.  Instructions for follow-up, activity and diet:	Follow up with PCP for ongoing medical management. Continue medications as prescribed. Low cholesterol diet.

## 2017-08-15 NOTE — ED ADULT NURSE NOTE - CHIEF COMPLAINT QUOTE
pt with a c/o Auditory and Visual Hallucinations , pt states " someone put something supernatural in my home , the thing was pulling at my feet all night, pt states I went to dialysis this am , and was only able to have 2 hours of treatment because the creature was pulling at my feet" . F/S at triage 110  pmhx ERSD HTN DM Walk in

## 2018-02-03 NOTE — ED ADULT TRIAGE NOTE - AS O2 DELIVERY
Breathing spontaneous and unlabored. Breath sounds clear and equal bilaterally with regular rhythm.
room air